# Patient Record
Sex: FEMALE | Race: WHITE | URBAN - METROPOLITAN AREA
[De-identification: names, ages, dates, MRNs, and addresses within clinical notes are randomized per-mention and may not be internally consistent; named-entity substitution may affect disease eponyms.]

---

## 2017-12-08 ENCOUNTER — THERAPY VISIT (OUTPATIENT)
Dept: PHYSICAL THERAPY | Facility: CLINIC | Age: 67
End: 2017-12-08
Payer: MEDICARE

## 2017-12-08 DIAGNOSIS — R10.2 PELVIC PAIN IN FEMALE: ICD-10-CM

## 2017-12-08 DIAGNOSIS — M53.3 PAIN IN THE COCCYX: Primary | ICD-10-CM

## 2017-12-08 PROCEDURE — 97110 THERAPEUTIC EXERCISES: CPT | Mod: GP | Performed by: PHYSICAL THERAPIST

## 2017-12-08 PROCEDURE — 97535 SELF CARE MNGMENT TRAINING: CPT | Mod: GP | Performed by: PHYSICAL THERAPIST

## 2017-12-08 PROCEDURE — G8987 SELF CARE CURRENT STATUS: HCPCS | Mod: GP | Performed by: PHYSICAL THERAPIST

## 2017-12-08 PROCEDURE — 97161 PT EVAL LOW COMPLEX 20 MIN: CPT | Mod: GP | Performed by: PHYSICAL THERAPIST

## 2017-12-08 PROCEDURE — G8988 SELF CARE GOAL STATUS: HCPCS | Mod: GP | Performed by: PHYSICAL THERAPIST

## 2017-12-08 PROCEDURE — 97035 APP MDLTY 1+ULTRASOUND EA 15: CPT | Mod: GP | Performed by: PHYSICAL THERAPIST

## 2017-12-08 NOTE — PROGRESS NOTES
[facility] Initial Evaluation    Subjective:    Patient is a 67 year old female presenting with rehab pelvic hpi. The history is provided by the patient.   Sushila Gomes is a 67 year old female with a pelvic dysfunction and other (coccyx pain) condition.  Condition occurred with:  Insidious onset.  Condition occurred: for unknown reasons.  This is a chronic condition  Reports onset of R tailbone pain in 11/2016 when sitting. She went to MD in 11/2016 and she advised getting a special cushion when sitting. She reports no improvement in the pain with sitting currently with pain also when rolling in bed(1-2x month). She had her physical on 11/29/17(noting pelvic pain with internal pelvic exam) and MD advised PT for pelvic floor rehab. Patient denies urinary incontinence however some bladder urgency problems. She c/o tailbone pain after sitting on certain chairs with minimal cushion for 10 minutes or longer and when transitioning from sit to stand.   .    Patient reports pain:  Other (coccyx).    Pain is described as other (bruise) and is intermittent and reported as 3/10.   Pain is the same all the time.  Symptoms are exacerbated by sitting and other (rolling in bed) and relieved by nothing.  Since onset symptoms are unchanged.        General health as reported by patient is good.  Pertinent medical history includes:  High blood pressure, heart problems, rheumatoid arthritis and overweight.  Medical allergies: none.  Other surgeries include:  Heart surgery.  Current medications:  Anti-inflammatory and high blood pressure medication.  Current occupation is retired.            Red flags:  None as reported by the patient.                        Objective:    System         Lumbar/SI Evaluation  ROM:  AROM Lumbar: normal    Strength: slouched sitting posture with weight shifted to L buttock  Lumbar Myotomes:  normal                  Neural Tension/Mobility:  Lumbar:  Normal        Lumbar  Palpation:  Palpation (lumbar): coccyxR<L.            SI joint/Sacrum:      Provocation:  Neg                                Pelvic Dysfunction Evaluation:    Bladder/Pelvic Problems:    Storage Problem:  Urgency          Flexibility:  normal      Abdominal Wall:  Abdominal wall pelvic: poor TA recruitment in supine with verbal cues.        Pelvic Clock Exam:    Ischiocavernosis pain:  -  Bulbocavernosis pain:  -  Transverse Perineal:  -  Levator ANI:  +  Perineal Body:  -      External Assessment:    Skin Condition:  Normal    Bearing Down/Coughing:  Normal  Tissue Symmetry:  Normal  Introitus:  Normal  Muscle Contraction/Perineal Mobility:  Slight lift, no urogential triangle descent  Internal Assessment:    Sensory Exam:  Normal  Contraction/Grade:  Weak squeeze, 2 second hold (2)          Additional History:  Delivery History:  Vaginal delivery  Number of Pregnancies: 2  Number of Live Births: 2                       General     ROS    Assessment/Plan:      Patient is a 67 year old female with sacral and pelvic complaints.    Patient has the following significant findings with corresponding treatment plan.                Diagnosis 1:  Coccyx pain, pelvic pain  Pain -  hot/cold therapy, manual therapy,ultrasound, self management, education and home program  Impaired muscle performance - neuro re-education and home program  Decreased function - therapeutic activities and home program  Impaired posture - neuro re-education and home program    Therapy Evaluation Codes:   1) History comprised of:   Personal factors that impact the plan of care:      None.    Comorbidity factors that impact the plan of care are:      None.     Medications impacting care: None.  2) Examination of Body Systems comprised of:   Body structures and functions that impact the plan of care:      Lumbar spine, Pelvis and Sacral illiac joint.   Activity limitations that impact the plan of care are:      Driving, Sitting and rolloing in  bed.  3) Clinical presentation characteristics are:   Stable/Uncomplicated.  4) Decision-Making    Low complexity using standardized patient assessment instrument and/or measureable assessment of functional outcome.  Cumulative Therapy Evaluation is: Low complexity.    Previous and current functional limitations:  (See Goal Flow Sheet for this information)    Short term and Long term goals: (See Goal Flow Sheet for this information)     Communication ability:  Patient appears to be able to clearly communicate and understand verbal and written communication and follow directions correctly.  Treatment Explanation - The following has been discussed with the patient:   RX ordered/plan of care  Anticipated outcomes  Possible risks and side effects  This patient would benefit from PT intervention to resume normal activities.   Rehab potential is good.    Frequency:  1 X week, once daily  Duration:  for 4 weeks then 2x month for 2 months  Discharge Plan:  Achieve all LTG.  Independent in home treatment program.  Reach maximal therapeutic benefit.    Please refer to the daily flowsheet for treatment today, total treatment time and time spent performing 1:1 timed codes.

## 2017-12-08 NOTE — LETTER
DEPARTMENT OF HEALTH AND HUMAN SERVICES  CENTERS FOR MEDICARE & MEDICAID SERVICES    PLAN/UPDATED PLAN OF PROGRESS FOR OUTPATIENT REHABILITATION    PATIENTS NAME:  Sushila Gomes   : 1950  PROVIDER NUMBER:    [id number]    [id number]     PROVIDER NAME: [facility]    MEDICAL RECORD NUMBER: [id number]     START OF CARE DATE:  SOC Date: 17   TYPE:  PT    PRIMARY/TREATMENT DIAGNOSIS: (Pertinent Medical Diagnosis)   Pain in the coccyx  Pelvic pain in female    VISITS FROM START OF CARE:  Rxs Used: 1     [facility] Initial Evaluation    Subjective:  Patient is a 67 year old female presenting with rehab pelvic hpi. The history is provided by the patient.   Sushila Gomes is a 67 year old female with a pelvic dysfunction and other (coccyx pain) condition.  Condition occurred with:  Insidious onset.  Condition occurred: for unknown reasons.  This is a chronic condition  Reports onset of R tailbone pain in 2016 when sitting. She went to MD in 2016 and she advised getting a special cushion when sitting. She reports no improvement in the pain with sitting currently with pain also when rolling in bed(1-2x month). She had her physical on 17(noting pelvic pain with internal pelvic exam) and MD advised PT for pelvic floor rehab. Patient denies urinary incontinence however some bladder urgency problems. She c/o tailbone pain after sitting on certain chairs with minimal cushion for 10 minutes or longer and when transitioning from sit to stand.   Patient reports pain:  Other (coccyx).    Pain is described as other (bruise) and is intermittent and reported as 3/10.   Pain is the same all the time.  Symptoms are exacerbated by sitting and other (rolling in bed) and relieved by nothing.  Since onset symptoms are unchanged. General health as reported by patient is good.  Pertinent medical history includes:  High blood pressure,  heart problems, rheumatoid arthritis and overweight.  Medical allergies: none.  Other surgeries include:  Heart surgery.  Current medications:  Anti-inflammatory and high blood pressure medication.  Current occupation is retired.     Red flags:  None as reported by the patient.           Objective:       Lumbar/SI Evaluation  ROM:  AROM Lumbar: normal  Strength: slouched sitting posture with weight shifted to L buttock  Lumbar Myotomes:  normal  PATIENTS NAME:  Sushila Gomes   : 1950    Neural Tension/Mobility:  Lumbar:  Normal    Lumbar Palpation:  Palpation (lumbar): coccyxR<L.  SI joint/Sacrum:      Provocation:  Neg     Pelvic Dysfunction Evaluation:    Bladder/Pelvic Problems:    Storage Problem:  Urgency  Flexibility:  normal  Abdominal Wall:  Abdominal wall pelvic: poor TA recruitment in supine with verbal cues.  Pelvic Clock Exam:    Ischiocavernosis pain:  -  Bulbocavernosis pain:  -  Transverse Perineal:  -  Levator ANI:  +  Perineal Body:  -  External Assessment:    Skin Condition:  Normal  Bearing Down/Coughing:  Normal  Tissue Symmetry:  Normal  Introitus:  Normal  Muscle Contraction/Perineal Mobility:  Slight lift, no urogential triangle descent  Internal Assessment:    Sensory Exam:  Normal  Contraction/Grade:  Weak squeeze, 2 second hold (2)  Additional History:  Delivery History:  Vaginal delivery  Number of Pregnancies: 2  Number of Live Births: 2      Assessment/Plan:    Patient is a 67 year old female with sacral and pelvic complaints.    Patient has the following significant findings with corresponding treatment plan.                Diagnosis 1:  Coccyx pain, pelvic pain  Pain -  hot/cold therapy, manual therapy,ultrasound, self management, education and home program  Impaired muscle performance - neuro re-education and home program  Decreased function - therapeutic activities and home program  Impaired posture - neuro re-education and home program    Therapy Evaluation Codes:    1) History comprised of:   Personal factors that impact the plan of care:      None.    Comorbidity factors that impact the plan of care are:      None.     Medications impacting care: None.  2) Examination of Body Systems comprised of:   Body structures and functions that impact the plan of care:      Lumbar spine, Pelvis and Sacral illiac joint.   Activity limitations that impact the plan of care are:    PATIENTS NAME:  Sushila Gomes   : 1950      Driving, Sitting and rolloing in bed.  3) Clinical presentation characteristics are:   Stable/Uncomplicated.  4) Decision-Making    Low complexity using standardized patient assessment instrument and/or measureable assessment of functional outcome.  Cumulative Therapy Evaluation is: Low complexity.    Previous and current functional limitations:  (See Goal Flow Sheet for this information)    Short term and Long term goals: (See Goal Flow Sheet for this information)     Communication ability:  Patient appears to be able to clearly communicate and understand verbal and written communication and follow directions correctly.  Treatment Explanation - The following has been discussed with the patient:   RX ordered/plan of care  Anticipated outcomes  Possible risks and side effects  This patient would benefit from PT intervention to resume normal activities.   Rehab potential is good.    Frequency:  1 X week, once daily  Duration:  for 4 weeks then 2x month for 2 months  Discharge Plan:  Achieve all LTG.  Independent in home treatment program.  Reach maximal therapeutic benefit.    Caregiver Signature/Credentials _____________________________ Date ________       Treating Provider: Desiree Anguiano , PT     I have reviewed and certified the need for these services and plan of treatment while under my care.      PHYSICIAN'S SIGNATURE:   _________________________________________  Date___________   Antonieta Berrios MD    Certification period:  Beginning of Cert date  "period: 12/08/17 to  End of Cert period date: 03/07/18     Functional Level Progress Report: Please see attached \"Goal Flow sheet for Functional level.\"    ____X____ Continue Services or       ________ DC Services                Service dates: From  SOC Date: 12/08/17 date to present                         "

## 2017-12-08 NOTE — MR AVS SNAPSHOT
After Visit Summary   12/8/2017    Sushila Gomes    MRN: [id number]           Patient Information     Date Of Birth          1950        Visit Information        Provider Department      12/8/2017 2:40 PM Desiree Anguiano, PT [facility]        Today's Diagnoses     Pain in the coccyx    -  1    Pelvic pain in female           Follow-ups after your visit        Your next 10 appointments already scheduled     Dec 13, 2017  1:20 PM CST   JAN For Women Only with Desiree Anguiano PT   [facility] ([facility])    [address]  [facility] [id number] [phone]            Dec 18, 2017  1:30 PM CST   JAN For Women Only with Desiree Anguiano PT   [facility] ([facility])    [address]  [facility] [id number] [phone]              Who to contact     If you have questions or need follow up information about today's clinic visit or your schedule please contact [facility] PHYSICAL THERAPY directly at [phone].  Normal or non-critical lab and imaging results will be communicated to you by MyChart, letter or phone within 4 business days after the clinic has received the results. If you do not hear from us within 7 days, please contact the clinic through MyChart or phone. If you have a critical or abnormal lab result, we will notify you by phone as soon as possible.  Submit refill requests through Pombai or call your pharmacy and they will forward the refill request to us. Please allow 3 business days for your refill to be completed.          Additional Information About Your Visit        Atrentahart Information     Pombai lets you send messages to your doctor, view your test results, renew your prescriptions, schedule appointments and more. To sign up, go to  [email or web address] . Click on \"Log in\" on the left side of the screen, which will take you to the Welcome page. Then click on \"Sign up Now\" on the right side of the page.     You will be asked to enter the access code listed below, as well as some personal information. Please follow the directions to create your username and password.     Your access code is: [email or web address]  Expires: 3/8/2018  4:47 PM     Your access code will  in 90 days. If you need help or a new code, please call your [place] clinic or [phone].        Care EveryWhere ID     This is your Care EveryWhere ID. This could be used by other organizations to access your [place] medical records  [id number]         Blood Pressure from Last 3 Encounters:   No data found for BP    Weight from Last 3 Encounters:   No data found for Wt              We Performed the Following     JAN CERT REPORT     JAN Inital Eval Report     PT Eval, Low Complexity (71551)     Self Care Management Training     Therapeutic Exercises     Ultrasound Therapy        Primary Care Provider Office Phone # Fax #    Antonieta KELLOGG [place] [phone]       [facility] [address] [place] [id number]        Equal Access to Services     JESU EDGAR : Hadii denisha rodas hadasho Soomaali, waaxda luqadaha, qaybta kaalmada adeegyada, waxay yari schafer. So [facility] [phone].    ATENCIÓN: Si habla español, tiene a valentin disposición servicios gratuitos de asistencia lingüística. Llame al [phone].    We comply with applicable federal civil rights laws and [place] laws. We do not discriminate on the basis of race, color, national origin, age, disability, sex, sexual orientation, or gender identity.            Thank you!     Thank you for choosing [facility] PH[facility]ICAL [facility]  for your care. Our goal is always to provide you with excellent care. Hearing back from our patients is one way we " can continue to improve our services. Please take a few minutes to complete the written survey that you may receive in the mail after your visit with us. Thank you!             Your Updated Medication List - Protect others around you: Learn how to safely use, store and throw away your medicines at www.disposemymeds.org.      Notice  As of 12/8/2017  4:47 PM    You have not been prescribed any medications.

## 2017-12-13 ENCOUNTER — THERAPY VISIT (OUTPATIENT)
Dept: PHYSICAL THERAPY | Facility: CLINIC | Age: 67
End: 2017-12-13
Payer: MEDICARE

## 2017-12-13 DIAGNOSIS — R10.2 PELVIC PAIN IN FEMALE: ICD-10-CM

## 2017-12-13 DIAGNOSIS — M53.3 PAIN IN THE COCCYX: ICD-10-CM

## 2017-12-13 PROCEDURE — 97035 APP MDLTY 1+ULTRASOUND EA 15: CPT | Mod: GP | Performed by: PHYSICAL THERAPIST

## 2017-12-13 PROCEDURE — 97140 MANUAL THERAPY 1/> REGIONS: CPT | Mod: GP | Performed by: PHYSICAL THERAPIST

## 2017-12-13 PROCEDURE — 97110 THERAPEUTIC EXERCISES: CPT | Mod: GP | Performed by: PHYSICAL THERAPIST

## 2017-12-13 NOTE — MR AVS SNAPSHOT
After Visit Summary   12/13/2017    Sushila Gomes    MRN: [id number]           Patient Information     Date Of Birth          1950        Visit Information        Provider Department      12/13/2017 1:20 PM Desiree Anguiano, PT [facility]        Today's Diagnoses     Pain in the coccyx        Pelvic pain in female           Follow-ups after your visit        Your next 10 appointments already scheduled     Dec 18, 2017  1:30 PM CST   JAN For Women Only with Desiree Anguiano PT   [facility] ([facility])    [address]  [facility] [id number] [phone]            Jan 05, 2018  1:20 PM CST   JAN For Women Only with Desiree Anguiano PT   [facility] ([facility])    [address]  [facility] [id number] [phone]              Who to contact     If you have questions or need follow up information about today's clinic visit or your schedule please contact [facility] PHYSICAL THERAPY directly at [phone].  Normal or non-critical lab and imaging results will be communicated to you by Resiliencehart, letter or phone within 4 business days after the clinic has received the results. If you do not hear from us within 7 days, please contact the clinic through Resiliencehart or phone. If you have a critical or abnormal lab result, we will notify you by phone as soon as possible.  Submit refill requests through Numari or call your pharmacy and they will forward the refill request to us. Please allow 3 business days for your refill to be completed.          Additional Information About Your Visit        MyCharYOOWALK Information     Numari lets you send messages to your doctor, view your test results, renew your prescriptions, schedule appointments and more. To sign up, go to [email or web address]  ". Click on \"Log in\" on the left side of the screen, which will take you to the Welcome page. Then click on \"Sign up Now\" on the right side of the page.     You will be asked to enter the access code listed below, as well as some personal information. Please follow the directions to create your username and password.     Your access code is: [email or web address]  Expires: 3/8/2018  4:47 PM     Your access code will  in 90 days. If you need help or a new code, please call your [facility] or [phone].        Care EveryWhere ID     This is your Care EveryWhere ID. This could be used by other organizations to access your [place] medical records  [id number]         Blood Pressure from Last 3 Encounters:   No data found for BP    Weight from Last 3 Encounters:   No data found for Wt              We Performed the Following     Manual Ther Tech, 1+Regions, EA 15 min     Therapeutic Exercises     Ultrasound Therapy        Primary Care Provider Office Phone # Fax #    Antonieta KELLOGG [place] [phone]       [facility] [address] [place] [id number]        Equal Access to Services     [facility] Hospital[facility] PABLO : Hadii aad ku hadasho Sodionisio, waaxda luqadaha, qaybta kaalmada adeegyada, dary chin . So [facility] [phone].    ATENCIÓN: Si habla español, tiene a valentin disposición servicios gratuitos de asistencia lingüística. Ll[facility] [phone].    We comply with applicable federal civil rights laws and [place] laws. We do not discriminate on the basis of race, color, national origin, age, disability, sex, sexual orientation, or gender identity.            Thank you!     Thank you for choosing [facility] PHYSICAL THERAP[facility]  for your care. Our goal is always to provide you with excellent care. Hearing back from our patients is one way we can continue to improve our services. Please take a few minutes to complete the written survey that " you may receive in the mail after your visit with us. Thank you!             Your Updated Medication List - Protect others around you: Learn how to safely use, store and throw away your medicines at www.disposemymeds.org.      Notice  As of 12/13/2017  4:12 PM    You have not been prescribed any medications.

## 2017-12-13 NOTE — PROGRESS NOTES
[facility]   Subjective:    HPI                    Objective:    System    Physical Exam    General     ROS    Assessment/Plan:      SUBJECTIVE  Subjective changes as noted by pt:  I'm having less tailbone pain overall with sitting however I have not sat on a hard chair with no cushion. I'm doing the elevator exercise 4-5x daily and watching my posture when sitting- trying not to slouch. It is not so painful to the touch now.     Current pain level: 1/10     Changes in function:  Yes (See Goal flowsheet attached for changes in current functional level)     Adverse reaction to treatment or activity:  None    OBJECTIVE  Changes in objective findings:  Yes, improved neutral spine sitting posture with verbal cues- less shifting weight to L gluteal.  Instructed in roll out with TB and added STM to obturator internus-point tenderness R>L with palpation.      ASSESSMENT  Sushila continues to require intervention to meet STG and LTG's: PT  Patient is progressing as expected.  Patient is becoming more independent in home exercise program  Response to therapy has shown an improvement in  pain level and posture  Progress made towards STG/LTG?  Yes (See Goal flowsheet attached for updates on achievement of STG and LTG)    PLAN  Current treatment program is being advanced to more complex exercises.  The following procedures have been added:  strengthening and manual therapy    PTA/ATC plan:  N/A    Please refer to the daily flowsheet for treatment today, total treatment time and time spent performing 1:1 timed codes.

## 2017-12-18 ENCOUNTER — THERAPY VISIT (OUTPATIENT)
Dept: PHYSICAL THERAPY | Facility: CLINIC | Age: 67
End: 2017-12-18
Payer: MEDICARE

## 2017-12-18 DIAGNOSIS — R10.2 PELVIC PAIN IN FEMALE: ICD-10-CM

## 2017-12-18 DIAGNOSIS — M53.3 PAIN IN THE COCCYX: ICD-10-CM

## 2017-12-18 PROCEDURE — 97035 APP MDLTY 1+ULTRASOUND EA 15: CPT | Mod: GP | Performed by: PHYSICAL THERAPIST

## 2017-12-18 PROCEDURE — 97110 THERAPEUTIC EXERCISES: CPT | Mod: GP | Performed by: PHYSICAL THERAPIST

## 2017-12-18 PROCEDURE — 97140 MANUAL THERAPY 1/> REGIONS: CPT | Mod: GP | Performed by: PHYSICAL THERAPIST

## 2017-12-18 NOTE — PROGRESS NOTES
[facility] Evaluation  Subjective:    HPI                    Objective:    System    Physical Exam    General     ROS    Assessment/Plan:      SUBJECTIVE  Subjective changes as noted by pt: I'm having less pain in tailbone when sitting on any surface. HEP is going well. Doing the elevator about 3-5 x daily.       Current pain level: 0/10     Changes in function:  Yes (See Goal flowsheet attached for changes in current functional level)     Adverse reaction to treatment or activity:  None    OBJECTIVE  Changes in objective findings:  Yes, less point tenderness with palpation to distal coccyx, less muscle tension obturator internus B, instructed in bridging/core exercises-tolerated well.         ASSESSMENT  Sushila continues to require intervention to meet STG and LTG's: PT  Patient is progressing as expected.  Patient is becoming more independent in home exercise program  Response to therapy has shown an improvement in  pain level, posture and function  Progress made towards STG/LTG?  Yes (See Goal flowsheet attached for updates on achievement of STG and LTG)    PLAN  Current treatment program is being advanced to more complex exercises.  The following procedures have been added:  strengthening    PTA/ATC plan:  N/A    Please refer to the daily flowsheet for treatment today, total treatment time and time spent performing 1:1 timed codes.            DISCHARGE REPORT    Progress reporting period is from 12/8/17  to 12/18/17.     SUBJECTIVE                   ;   ,     Patient has failed to return to therapy so current objective findings are unknown.  The subjective and objective information are from the last SOAP note on this patient.    OBJECTIVE         ASSESSMENT/PLAN  Updated problem list and treatment plan: Diagnosis 1:  Coccyx pain    STG/LTGs have been met or progress has been made towards goals:  unknown  Assessment of Progress: The patient has not returned to therapy. Current status is  unknown.  Self Management Plans:  Patient has been instructed in a home treatment program.  Patient  has been instructed in self management of symptoms.    Sushila continues to require the following intervention to meet STG and LTG's: PT  The patient failed to complete scheduled/ordered appointments so current information is unknown.  We will discharge this patient from PT.    Recommendations:  DC    Please refer to the daily flowsheet for treatment today, total treatment time and time spent performing 1:1 timed codes.

## 2017-12-18 NOTE — MR AVS SNAPSHOT
"              After Visit Summary   12/18/2017    Sushila Gomes    MRN: [id number]           Patient Information     Date Of Birth          1950        Visit Information        Provider Department      12/18/2017 1:30 PM Desiree Anguiano PT [facility]        Today's Diagnoses     Pain in the coccyx        Pelvic pain in female           Follow-ups after your visit        Your next 10 appointments already scheduled     Jan 05, 2018  1:20 PM CST   [facility] with Desiree Anguiano PT   [facility] ([facility])    [address]  [facility] [id number] [phone]              Who to contact     If you have questions or need follow up information about today's clinic visit or your schedule please contact [facility]ER[facility] directly at [phone].  Normal or non-critical lab and imaging results will be communicated to you by Mambuhart, letter or phone within 4 business days after the clinic has received the results. If you do not hear from us within 7 days, please contact the clinic through Mambuhart or phone. If you have a critical or abnormal lab result, we will notify you by phone as soon as possible.  Submit refill requests through Fliptop or call your pharmacy and they will forward the refill request to us. Please allow 3 business days for your refill to be completed.          Additional Information About Your Visit        Mambuhart Information     Fliptop lets you send messages to your doctor, view your test results, renew your prescriptions, schedule appointments and more. To sign up, go to [email or web address] . Click on \"Log in\" on the left side of the screen, which will take you to the Welcome page. Then click on \"Sign up Now\" on the right side of the page.     You will be asked to enter the access code listed below, as well as some " personal information. Please follow the directions to create your username and password.     Your access code is: [email or web address]  Expires: 3/8/2018  4:47 PM     Your access code will  in 90 days. If you need help or a new code, please call your [place] clinic or [phone].        Care EveryWhere ID     This is your Care EveryWhere ID. This could be used by other organizations to access your [place] medical records  [id number]         Blood Pressure from Last 3 Encounters:   No data found for BP    Weight from Last 3 Encounters:   No data found for Wt              We Performed the Following     Manual Ther Tech, 1+Regions, EA 15 min     Therapeutic Exercises     Ultrasound Therapy        Primary Care Provider Office Phone # Fax #    Antonieta KELLOGG Aquasco [phone]       [facility] [address]   [facility] [address]        Equal Access to Services     [facility] Center[facility] : Hadii aad ku hadasho Soomaali, waaxda luqadaha, qaybta kaalmada adeegyada, waxay austinin hayaan adeconcha chin . So [facility] [phone].    ATENCIÓN: Si habla español, tiene a valentin disposición servicios gratuitos de asistencia lingüística. Llame al [phone].    We comply with applicable federal civil rights laws and [place] laws. We do not discriminate on the basis of race, color, national origin, age, disability, sex, sexual orientation, or gender identity.            Thank you!     Thank you for choosing [facility]  for your care. Our goal is always to provide you with excellent care. Hearing back from our patients is one way we can continue to improve our services. Please take a few minutes to complete the written survey that you may receive in the mail after your visit with us. Thank you!             Your Updated Medication List - Protect others around you: Learn how to safely use, store and throw away your medicines at www.disposemymeds.org.       Notice  As of 12/18/2017  2:11 PM    You have not been prescribed any medications.

## 2018-02-14 PROBLEM — R10.2 PELVIC PAIN IN FEMALE: Status: RESOLVED | Noted: 2017-12-08 | Resolved: 2018-02-14

## 2018-02-14 PROBLEM — M53.3 PAIN IN THE COCCYX: Status: RESOLVED | Noted: 2017-12-08 | Resolved: 2018-02-14

## 2023-05-02 ENCOUNTER — LAB REQUISITION (OUTPATIENT)
Dept: LAB | Facility: CLINIC | Age: 73
End: 2023-05-02
Payer: COMMERCIAL

## 2023-05-02 DIAGNOSIS — N84.0 POLYP OF CORPUS UTERI: ICD-10-CM

## 2023-05-02 PROCEDURE — 88305 TISSUE EXAM BY PATHOLOGIST: CPT | Mod: TC,ORL | Performed by: OBSTETRICS & GYNECOLOGY

## 2023-05-02 PROCEDURE — 88305 TISSUE EXAM BY PATHOLOGIST: CPT | Mod: 26 | Performed by: STUDENT IN AN ORGANIZED HEALTH CARE EDUCATION/TRAINING PROGRAM

## 2023-05-04 LAB
PATH REPORT.COMMENTS IMP SPEC: NORMAL
PATH REPORT.COMMENTS IMP SPEC: NORMAL
PATH REPORT.FINAL DX SPEC: NORMAL
PATH REPORT.GROSS SPEC: NORMAL
PATH REPORT.MICROSCOPIC SPEC OTHER STN: NORMAL
PATH REPORT.RELEVANT HX SPEC: NORMAL
PHOTO IMAGE: NORMAL